# Patient Record
Sex: MALE | Race: BLACK OR AFRICAN AMERICAN | NOT HISPANIC OR LATINO | URBAN - METROPOLITAN AREA
[De-identification: names, ages, dates, MRNs, and addresses within clinical notes are randomized per-mention and may not be internally consistent; named-entity substitution may affect disease eponyms.]

---

## 2018-09-06 NOTE — PATIENT DISCUSSION
CATARACTS OU: Educated patient about diagnosis. New spectacle prescription given to patient. Surgery not indicated, will continue to monitor.

## 2018-09-06 NOTE — PATIENT DISCUSSION
GLAUCOMA SUSPECT DIAGNOSIS:  I have explained to the patient at length regarding the diagnosis of glaucoma and its pathophysiology. I have explained that damage to the optic nerve from glaucoma is irreversible and that the available treatments for glaucoma are designed to prevent further damage if we determine that glaucoma is present. I have explained the importance of regular follow-up to monitor for possible progression to definitive glaucoma. I have answered all questions to the patient's satisfaction.

## 2020-01-20 NOTE — PATIENT DISCUSSION
DRY EYES : Discussed with patient the importance of keeping the eye moist and the symptoms associated with dry eyes including blurry vision, tearing, burning, and víctor sensation. Advised patient to minimize use of any fans blowing directly on the face. Advised patient to continue with artificial tears 2-3 times daily.

## 2020-02-03 NOTE — PATIENT DISCUSSION
Stopped Today: ofloxacin (ofloxacin): drops: 0.3% 1 drop four times a day as directed into affected eye 02-

## 2020-02-03 NOTE — PATIENT DISCUSSION
Stopped Today: Acular (ketorolac): drops: 0.5% 1 drop four times a day as directed into affected eye 02-

## 2020-02-03 NOTE — PATIENT DISCUSSION
New Prescription: prednisoln fj-cgivihif-ojkyfil (prednisoln yd-oldyfqoq-jwyttny): drops: 1-0.5-0.075% 1 drop four times a day as directed into affected eye 02-

## 2020-02-03 NOTE — PATIENT DISCUSSION
Stopped Today: Pred Forte (prednisolone acetate): drops,suspension: 1% 1 drop four times a day into affected eye 02-

## 2020-02-13 NOTE — PATIENT DISCUSSION
1 DAY POST-OP, OS- Continue combo drop 4 times a day until next appt. Patient was also given instruction sheet.

## 2020-02-13 NOTE — PATIENT DISCUSSION
Continue: prednisoln fx-wrpmhjhm-qcczhqj (prednisoln co-qzdgnexr-aasxqgv): drops: 1-0.5-0.075% 1 drop four times a day as directed into affected eye 02-

## 2020-02-17 NOTE — PATIENT DISCUSSION
Continue: prednisoln id-gccwjhvo-pmykvla (prednisoln kq-qoejkmty-nbgyyli): drops: 1-0.5-0.075% 1 drop four times a day as directed into affected eye 02-

## 2020-02-27 NOTE — PATIENT DISCUSSION
Continue: prednisoln zh-sxwmcusj-wzyewkn (prednisoln hb-hpgfhgni-eofhsjb): drops: 1-0.5-0.075% 1 drop four times a day as directed into affected eye 02-

## 2020-03-05 NOTE — PATIENT DISCUSSION
POSTOP: ALL LOOKS GOOD. CONTINUE COMBO DROPS.  WILL SEE BACK IN A MONTH AND GIVE GLASSES RX AT THAT TIME

## 2020-03-05 NOTE — PATIENT DISCUSSION
Continue: prednisoln ja-hgbvlpnw-xspyghx (prednisoln yx-fhhaoxus-yyloohl): drops: 1-0.5-0.075% 1 drop four times a day as directed into affected eye 02-

## 2022-02-01 ENCOUNTER — NEW PATIENT (OUTPATIENT)
Dept: URBAN - METROPOLITAN AREA CLINIC 52 | Facility: CLINIC | Age: 32
End: 2022-02-01

## 2022-02-01 DIAGNOSIS — Z01.01: ICD-10-CM

## 2022-02-01 DIAGNOSIS — H18.603: ICD-10-CM

## 2022-02-01 DIAGNOSIS — E11.9: ICD-10-CM

## 2022-02-01 PROCEDURE — 92004 COMPRE OPH EXAM NEW PT 1/>: CPT

## 2022-02-01 PROCEDURE — 92025 CPTRIZED CORNEAL TOPOGRAPHY: CPT

## 2022-02-01 PROCEDURE — 92015 DETERMINE REFRACTIVE STATE: CPT

## 2022-02-01 ASSESSMENT — KERATOMETRY
OD_AXISANGLE2_DEGREES: 102
OD_AXISANGLE_DEGREES: 12
OS_AXISANGLE2_DEGREES: 069
OD_K2POWER_DIOPTERS: 44.75
OD_K1POWER_DIOPTERS: 43.50
OS_K1POWER_DIOPTERS: 43.50
OS_AXISANGLE_DEGREES: 159
OS_K2POWER_DIOPTERS: 46.75

## 2022-02-01 ASSESSMENT — VISUAL ACUITY
OS_CC: 20/25-2
OU_CC: J1+
OS_SC: 20/20
OD_SC: 20/400 BLURRY
OD_CC: 20/20-1

## 2022-02-01 ASSESSMENT — TONOMETRY
OS_IOP_MMHG: 18
OD_IOP_MMHG: 18

## 2022-02-01 NOTE — PATIENT DISCUSSION
Recommend continue care with Dr. Ramiro Foster. Will request previous records. Joseph done in office for establishment.  Discussed option of RGP but patient declines at this time and states he had difficulty wearing RGPs in the past.